# Patient Record
(demographics unavailable — no encounter records)

---

## 2024-11-06 NOTE — HISTORY OF PRESENT ILLNESS
[FreeTextEntry1] : Pt w/ c/o LLQ abd fullness/pain x 1 mo > needs to see Gyn/Poss GI S/P R Br Bx w/NL (5/20/16)(Shreyas Anguiano): +focal LCIS (classic)/RSL S/P R Sono Core Bx ((R 12:00 N1)(1/27/16): +RSL + R hematoma following bx req AB's Started on tmx/ASAb ([facility]elman (7/16) > sw'ed to Femara (11/19) > d/c'ed (7/21) Pt on Effexor and Welbutrin S/P L Sono Core Bx ( L 3:30)(5/24/18): Benign S/P endom bx (11/16)(Matias): Benign  S/P R MRI Bx (R 5:00)(11/28/16): Benign +FH Br Ca (M. FC 50) Weened off of Effexor > depression worsened > back on Effexor Colonoscopy (3/22): +polyps > F/U 3yrs PAP/Pelvic (6/22): "WNL"  Got J&J (3/21) Got Moderna booster (11/21)(LUCIAN) No MH/FH changes. ROS reviewed/discussed. LMP (7/16). Taking Ca/Vit D. Bone Density (8/22): +mild osteopenia LS & Hip  Mammo/Sono (9/27/23): FG, NSF MRI (5/8/24): NSF

## 2024-11-06 NOTE — PHYSICAL EXAM
[Normocephalic] : normocephalic [Atraumatic] : atraumatic [Supple] : supple [No Supraclavicular Adenopathy] : no supraclavicular adenopathy [No Cervical Adenopathy] : no cervical adenopathy [No Thyromegaly] : no thyromegaly [Normal Sinus Rhythm] : normal sinus rhythm [Examined in the supine and seated position] : examined in the supine and seated position [No dominant masses] : no dominant masses in right breast  [No dominant masses] : no dominant masses left breast [No Nipple Retraction] : no left nipple retraction [No Nipple Discharge] : no left nipple discharge [No Axillary Lymphadenopathy] : no left axillary lymphadenopathy [No Edema] : no edema [No Rashes] : no rashes [No Ulceration] : no ulceration [de-identified] : +FC tissue NSF  [de-identified] : +FC tissue NSF

## 2025-05-14 NOTE — HISTORY OF PRESENT ILLNESS
[FreeTextEntry1] : Pt w/ c/o LLQ abd fullness/pain x 1 mo > needs to see Gyn/Poss GI S/P R Br Bx w/NL (5/20/16)(Shreyas Anguiano): +focal LCIS (classic)/RSL S/P R Sono Core Bx ((R 12:00 N1)(1/27/16): +RSL + R hematoma following bx req AB's Started on tmx/ASAb (M[place]elman (7/16) > sw'ed to Femara (11/19) > d/c'ed (7/21) Pt on Effexor and Welbutrin S/P L Sono Core Bx ( L 3:30)(5/24/18): Benign S/P endom bx (11/16)(Matias): Benign  S/P R MRI Bx (R 5:00)(11/28/16): Benign +FH Br Ca (M. FC 50) Weened off of Effexor > depression worsened > back on Effexor Colonoscopy (3/22): +polyps > F/U 3yrs PAP/Pelvic (3/25): "WNL"  Got J&J (3/21) Got Moderna booster (11/21)(LUCIAN) No MH/FH changes. ROS reviewed/discussed. LMP (7/16). Taking Ca/Vit D. Bone Density (8/22): +mild osteopenia LS & Hip  Mammo/Sono (11/22/24): FG, NSF MRI (5/8/24): NSF

## 2025-05-14 NOTE — REVIEW OF SYSTEMS
[Anxiety] : anxiety [Depression] : depression [Hot Flashes] : hot flashes [Easy Bruising] : a tendency for easy bruising [Negative] : Neurological

## 2025-05-14 NOTE — PHYSICAL EXAM
[Normocephalic] : normocephalic [Atraumatic] : atraumatic [Supple] : supple [No Supraclavicular Adenopathy] : no supraclavicular adenopathy [No Cervical Adenopathy] : no cervical adenopathy [No Thyromegaly] : no thyromegaly [Normal Sinus Rhythm] : normal sinus rhythm [Examined in the supine and seated position] : examined in the supine and seated position [No dominant masses] : no dominant masses in right breast  [No dominant masses] : no dominant masses left breast [No Nipple Retraction] : no left nipple retraction [No Nipple Discharge] : no left nipple discharge [No Axillary Lymphadenopathy] : no left axillary lymphadenopathy [No Edema] : no edema [No Rashes] : no rashes [No Ulceration] : no ulceration [de-identified] : +FC tissue NSF  [de-identified] : +FC tissue NSF